# Patient Record
Sex: FEMALE | Race: BLACK OR AFRICAN AMERICAN | NOT HISPANIC OR LATINO | Employment: FULL TIME | URBAN - METROPOLITAN AREA
[De-identification: names, ages, dates, MRNs, and addresses within clinical notes are randomized per-mention and may not be internally consistent; named-entity substitution may affect disease eponyms.]

---

## 2024-10-22 ENCOUNTER — TELEMEDICINE (OUTPATIENT)
Dept: FAMILY MEDICINE CLINIC | Facility: TELEHEALTH | Age: 19
End: 2024-10-22
Payer: COMMERCIAL

## 2024-10-22 DIAGNOSIS — R22.0 SWELLING, MASS, OR LUMP ON FACE: Primary | ICD-10-CM

## 2024-10-22 PROCEDURE — 99213 OFFICE O/P EST LOW 20 MIN: CPT | Performed by: NURSE PRACTITIONER

## 2024-10-22 RX ORDER — PREDNISONE 20 MG/1
20 TABLET ORAL 2 TIMES DAILY
Qty: 10 TABLET | Refills: 0 | Status: SHIPPED | OUTPATIENT
Start: 2024-10-22 | End: 2024-10-27

## 2024-10-22 NOTE — PROGRESS NOTES
HPI  Beryl Adams is a 19 y.o. female  presents with complaint of swelling beside right ear that started within the last few days. Was seen at ER yesterday and was told he had an infected lymph node, prescribed augmentin. She has taken 2 doses but feels like swelling has enlarged some. Denies fever, chills, sweats, headaches, vision changes, ear pain. She reports ER provider said her ear looked ok on the inside.     Review of Systems    No past medical history on file.    No family history on file.    Social History     Socioeconomic History    Marital status: Single   Tobacco Use    Smoking status: Never    Smokeless tobacco: Never   Vaping Use    Vaping status: Never Used         There were no vitals taken for this visit.    PHYSICAL EXAM  Physical Exam   Constitutional: She appears well-developed and well-nourished.   HENT:   Head: Normocephalic.       Nose: Nose normal.   Neck: Neck normal appearance.  Pulmonary/Chest: Effort normal.   Neurological: She is alert.   Psychiatric: She has a normal mood and affect. Her speech is normal.       Diagnoses and all orders for this visit:    1. Swelling, mass, or lump on face (Primary)  -     predniSONE (DELTASONE) 20 MG tablet; Take 1 tablet by mouth 2 (Two) Times a Day for 5 days.  Dispense: 10 tablet; Refill: 0      Continue antibiotics. If fever develops or symptoms worsen, go straight to ER. If no improvement within next 24-48 hours, return to ER. Pt and mother verbalized understanding.     FOLLOW-UP  As discussed during visit with [facility], if symptoms worsen or fail to improve, follow-up with PCP/Urgent Care/Emergency Department.    Patient verbalizes understanding of medications, instructions for treatment and follow-up.    Iliana Whiting, NAZANIN  10/22/2024  16:47 EDT    The use of a video visit has been reviewed with the patient and verbal informed consent has been obtained. Myself and Beryl Adams participated in this visit. The patient is located  in [place], [place], and I am located in [place], [place]. [employer] and [employer] were utilized.

## 2024-10-22 NOTE — LETTER
October 22, 2024     Patient: Beryl Adams   YOB: 2005   Date of Visit: 10/22/2024       To Whom It May Concern:    It is my medical opinion that Beryl Adams  should be excused from work today and tomorrow .           Sincerely,        NAZANIN Billingsley    CC: No Recipients